# Patient Record
Sex: FEMALE | Race: OTHER | NOT HISPANIC OR LATINO | URBAN - METROPOLITAN AREA
[De-identification: names, ages, dates, MRNs, and addresses within clinical notes are randomized per-mention and may not be internally consistent; named-entity substitution may affect disease eponyms.]

---

## 2019-06-27 ENCOUNTER — EMERGENCY (EMERGENCY)
Facility: HOSPITAL | Age: 29
LOS: 1 days | Discharge: HOME | End: 2019-06-27
Attending: EMERGENCY MEDICINE | Admitting: EMERGENCY MEDICINE
Payer: COMMERCIAL

## 2019-06-27 VITALS
RESPIRATION RATE: 18 BRPM | TEMPERATURE: 98 F | DIASTOLIC BLOOD PRESSURE: 72 MMHG | SYSTOLIC BLOOD PRESSURE: 104 MMHG | HEART RATE: 90 BPM | OXYGEN SATURATION: 97 %
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
## 2019-06-27 DIAGNOSIS — S09.90XA UNSPECIFIED INJURY OF HEAD, INITIAL ENCOUNTER: ICD-10-CM

## 2019-06-27 DIAGNOSIS — R51 HEADACHE: ICD-10-CM
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
## 2019-06-27 PROCEDURE — 70450 CT HEAD/BRAIN W/O DYE: CPT | Mod: 26

## 2019-06-27 PROCEDURE — 99284 EMERGENCY DEPT VISIT MOD MDM: CPT | Mod: 25
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
## 2019-06-27 NOTE — ED ADULT TRIAGE NOTE - NS ED TRIAGE AVPU SCALE
I will SWITCH the dose or number of times a day I take the medications listed below when I get home from the hospital:  None
Alert-The patient is alert, awake and responds to voice. The patient is oriented to time, place, and person. The triage nurse is able to obtain subjective information.
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
## 2019-06-28 NOTE — ED ADULT NURSE NOTE - CHPI ED NUR SYMPTOMS NEG
no blurred vision/no dizziness/no confusion/no loss of consciousness/no syncope/no change in level of consciousness/no nausea/no weakness/no seizure/no vomiting

## 2019-06-28 NOTE — ED PROVIDER NOTE - NSFOLLOWUPINSTRUCTIONS_ED_ALL_ED_FT
For two weeks, please limit your screen time to what is essential, no sports or gym, no swimming, and get  at least 8 hours sleep at night.    See your doctor or call [facility] Services to make a primary care appointment this week.    Avoid alcohol.    Tylenol 650mg or ibuprofen 400mg every 6 hours as needed for pain

## 2020-11-09 NOTE — ED PROVIDER NOTE - NSCAREINITIATED _GEN_ER
Tim Gonzalez(Attending) Thalidomide Pregnancy And Lactation Text: This medication is Pregnancy Category X and is absolutely contraindicated during pregnancy. It is unknown if it is excreted in breast milk.

## 2021-06-07 NOTE — ED ADULT TRIAGE NOTE - BP NONINVASIVE DIASTOLIC (MM HG)
Pulmonary office prescribed meds to pt for cough.  
Rec'vd call from pt with c/o worsening cough.  Pt stated she started to experience this cough about 2 weeks ago & was told to call office if it worsened.  Pt states she has been coughing all day & all night & cannot get a break.  Pt unable to speak full sentences with RN without coughing throughout conversation.      Spoke with Ella MORALES re: pt's current condition & concerns.  ANDREW stated she would consult with pulmonology as pt has been receiving a pulm work up.  
72

## 2021-06-22 ENCOUNTER — EMERGENCY (EMERGENCY)
Facility: HOSPITAL | Age: 31
LOS: 1 days | Discharge: HOME | End: 2021-06-22
Admitting: EMERGENCY MEDICINE
Payer: COMMERCIAL

## 2021-06-22 VITALS
WEIGHT: 100.09 LBS | RESPIRATION RATE: 16 BRPM | OXYGEN SATURATION: 100 % | TEMPERATURE: 98 F | HEART RATE: 89 BPM | HEIGHT: 66 IN | DIASTOLIC BLOOD PRESSURE: 71 MMHG | SYSTOLIC BLOOD PRESSURE: 103 MMHG

## 2021-06-22 VITALS
OXYGEN SATURATION: 99 % | TEMPERATURE: 99 F | DIASTOLIC BLOOD PRESSURE: 66 MMHG | HEART RATE: 69 BPM | SYSTOLIC BLOOD PRESSURE: 98 MMHG | RESPIRATION RATE: 17 BRPM

## 2021-06-22 DIAGNOSIS — K92.1 MELENA: ICD-10-CM

## 2021-06-22 DIAGNOSIS — R10.30 LOWER ABDOMINAL PAIN, UNSPECIFIED: ICD-10-CM

## 2021-06-22 PROBLEM — Z78.9 OTHER SPECIFIED HEALTH STATUS: Chronic | Status: ACTIVE | Noted: 2019-06-28

## 2021-06-22 LAB
ALBUMIN SERPL ELPH-MCNC: 3.8 G/DL — SIGNIFICANT CHANGE UP (ref 3.4–5)
ALP SERPL-CCNC: 46 U/L — SIGNIFICANT CHANGE UP (ref 40–120)
ALT FLD-CCNC: 16 U/L — SIGNIFICANT CHANGE UP (ref 12–42)
ANION GAP SERPL CALC-SCNC: 7 MMOL/L — LOW (ref 9–16)
APPEARANCE UR: CLEAR — SIGNIFICANT CHANGE UP
AST SERPL-CCNC: 13 U/L — LOW (ref 15–37)
BASOPHILS # BLD AUTO: 0.02 K/UL — SIGNIFICANT CHANGE UP (ref 0–0.2)
BASOPHILS NFR BLD AUTO: 0.3 % — SIGNIFICANT CHANGE UP (ref 0–2)
BILIRUB DIRECT SERPL-MCNC: 0.1 MG/DL — SIGNIFICANT CHANGE UP
BILIRUB INDIRECT FLD-MCNC: 0.9 MG/DL — SIGNIFICANT CHANGE UP (ref 0.2–1)
BILIRUB SERPL-MCNC: 1 MG/DL — SIGNIFICANT CHANGE UP (ref 0.2–1.2)
BILIRUB UR-MCNC: NEGATIVE — SIGNIFICANT CHANGE UP
BUN SERPL-MCNC: 11 MG/DL — SIGNIFICANT CHANGE UP (ref 7–23)
CALCIUM SERPL-MCNC: 9.1 MG/DL — SIGNIFICANT CHANGE UP (ref 8.5–10.5)
CHLORIDE SERPL-SCNC: 103 MMOL/L — SIGNIFICANT CHANGE UP (ref 96–108)
CO2 SERPL-SCNC: 24 MMOL/L — SIGNIFICANT CHANGE UP (ref 22–31)
COLOR SPEC: YELLOW — SIGNIFICANT CHANGE UP
CREAT SERPL-MCNC: 0.59 MG/DL — SIGNIFICANT CHANGE UP (ref 0.5–1.3)
DIFF PNL FLD: NEGATIVE — SIGNIFICANT CHANGE UP
EOSINOPHIL # BLD AUTO: 0.11 K/UL — SIGNIFICANT CHANGE UP (ref 0–0.5)
EOSINOPHIL NFR BLD AUTO: 1.4 % — SIGNIFICANT CHANGE UP (ref 0–6)
GLUCOSE SERPL-MCNC: 84 MG/DL — SIGNIFICANT CHANGE UP (ref 70–99)
GLUCOSE UR QL: NEGATIVE — SIGNIFICANT CHANGE UP
HCG UR QL: NEGATIVE — SIGNIFICANT CHANGE UP
HCT VFR BLD CALC: 40.8 % — SIGNIFICANT CHANGE UP (ref 34.5–45)
HGB BLD-MCNC: 13.5 G/DL — SIGNIFICANT CHANGE UP (ref 11.5–15.5)
IMM GRANULOCYTES NFR BLD AUTO: 0.3 % — SIGNIFICANT CHANGE UP (ref 0–1.5)
KETONES UR-MCNC: NEGATIVE — SIGNIFICANT CHANGE UP
LEUKOCYTE ESTERASE UR-ACNC: NEGATIVE — SIGNIFICANT CHANGE UP
LYMPHOCYTES # BLD AUTO: 1.24 K/UL — SIGNIFICANT CHANGE UP (ref 1–3.3)
LYMPHOCYTES # BLD AUTO: 15.7 % — SIGNIFICANT CHANGE UP (ref 13–44)
MANUAL SMEAR VERIFICATION: SIGNIFICANT CHANGE UP
MCHC RBC-ENTMCNC: 32.5 PG — SIGNIFICANT CHANGE UP (ref 27–34)
MCHC RBC-ENTMCNC: 33.1 GM/DL — SIGNIFICANT CHANGE UP (ref 32–36)
MCV RBC AUTO: 98.1 FL — SIGNIFICANT CHANGE UP (ref 80–100)
MONOCYTES # BLD AUTO: 0.59 K/UL — SIGNIFICANT CHANGE UP (ref 0–0.9)
MONOCYTES NFR BLD AUTO: 7.5 % — SIGNIFICANT CHANGE UP (ref 2–14)
NEUTROPHILS # BLD AUTO: 5.92 K/UL — SIGNIFICANT CHANGE UP (ref 1.8–7.4)
NEUTROPHILS NFR BLD AUTO: 74.8 % — SIGNIFICANT CHANGE UP (ref 43–77)
NITRITE UR-MCNC: NEGATIVE — SIGNIFICANT CHANGE UP
NRBC # BLD: 0 /100 WBCS — SIGNIFICANT CHANGE UP (ref 0–0)
PH UR: 6 — SIGNIFICANT CHANGE UP (ref 5–8)
PLAT MORPH BLD: NORMAL — SIGNIFICANT CHANGE UP
PLATELET # BLD AUTO: 91 K/UL — LOW (ref 150–400)
POTASSIUM SERPL-MCNC: 4 MMOL/L — SIGNIFICANT CHANGE UP (ref 3.5–5.3)
POTASSIUM SERPL-SCNC: 4 MMOL/L — SIGNIFICANT CHANGE UP (ref 3.5–5.3)
PROT SERPL-MCNC: 7.5 G/DL — SIGNIFICANT CHANGE UP (ref 6.4–8.2)
PROT UR-MCNC: NEGATIVE MG/DL — SIGNIFICANT CHANGE UP
RBC # BLD: 4.16 M/UL — SIGNIFICANT CHANGE UP (ref 3.8–5.2)
RBC # FLD: 12.9 % — SIGNIFICANT CHANGE UP (ref 10.3–14.5)
RBC BLD AUTO: NORMAL — SIGNIFICANT CHANGE UP
SODIUM SERPL-SCNC: 134 MMOL/L — SIGNIFICANT CHANGE UP (ref 132–145)
SP GR SPEC: 1.01 — SIGNIFICANT CHANGE UP (ref 1–1.03)
UROBILINOGEN FLD QL: 0.2 E.U./DL — SIGNIFICANT CHANGE UP
WBC # BLD: 7.9 K/UL — SIGNIFICANT CHANGE UP (ref 3.8–10.5)
WBC # FLD AUTO: 7.9 K/UL — SIGNIFICANT CHANGE UP (ref 3.8–10.5)

## 2021-06-22 PROCEDURE — 99284 EMERGENCY DEPT VISIT MOD MDM: CPT

## 2021-06-22 RX ORDER — FAMOTIDINE 10 MG/ML
20 INJECTION INTRAVENOUS ONCE
Refills: 0 | Status: COMPLETED | OUTPATIENT
Start: 2021-06-22 | End: 2021-06-22

## 2021-06-22 RX ADMIN — FAMOTIDINE 20 MILLIGRAM(S): 10 INJECTION INTRAVENOUS at 16:03

## 2021-06-22 NOTE — ED ADULT TRIAGE NOTE - CHIEF COMPLAINT QUOTE
black stools/ brbpr began last night w/ lower abdominal- diarrhea x 1 week accompanied by weakness/fatigue worsening today

## 2021-06-22 NOTE — ED PROVIDER NOTE - PATIENT PORTAL LINK FT
You can access the FollowMyHealth Patient Portal offered by [facility] by registering at the following website: [email or web address]. By joining [facility]’s FollowMyHealth portal, you will also be able to view your health information using other applications (apps) compatible with our system.

## 2021-06-22 NOTE — ED PROVIDER NOTE - PHYSICAL EXAMINATION
Chaperoned BY PCA Regla Lombardi    Physical Exam    Vital Signs: I have reviewed the initial vital signs.  Constitutional: well-nourished, appears stated age, no acute distress  Eyes: PERRLA, and symmetrical lids.  ENT: Neck supple with no adenopathy, moist MM.  Cardiovascular: regular rate, regular rhythm, well-perfused extremities  Respiratory: unlabored respiratory effort, clear to auscultation bilaterally  Gastrointestinal: soft, non-tender abdomen, rectal exam brown stools, no rectal tares, no hemorrhoids   Musculoskeletal: supple neck, no lower extremity edema  Integumentary: warm, dry, no rash  Neurologic: extremities’ motor and sensory functions grossly intact  Psychiatric: A&Ox3

## 2021-06-22 NOTE — ED PROVIDER NOTE - CARE PROVIDER_API CALL
CHAN NOLASCO  [id number]  [address] [place] [address]  Phone: [phone]  Fax: [phone]  Follow Up Time:

## 2021-06-22 NOTE — ED PROVIDER NOTE - NS ED ROS FT
Review of Systems    Constitutional: (-) fever  Eyes/ENT: (-) change in vision, (-) sore throat, (-) ear pain  Cardiovascular: (-) chest pain, (-) palpitation   Respiratory: (-) cough, (-) shortness of breath  Gastrointestinal: (-) abdominal pain (-) vomiting, (-) diarrhea  : (-) vaginal bleeding, (-) vaginal discharge  Integumentary: (-) rash, (-) edema  Neurological: (-) headache, (-) altered mental status  Heme/Lymph: (-) easy bruising (-) easy bleeding (-) lymphadenopathy  Allergic/Immunologic: (-) pruritus

## 2021-06-22 NOTE — ED PROVIDER NOTE - NSFOLLOWUPINSTRUCTIONS_ED_ALL_ED_FT
RETURN TO THE ED IF YOU DEVELOP WORSENING ABDOMINAL PAIN  WORSENING EPISODES OF DARK OR BLOODY STOOLS WITH LOSS OF CONSCIOUSNESS, LIGHTHEADEDNESS, CHEST PAIN AND SHORTNESS OF BREATH    FOLLOW UP WITH GI AS DIRECTED      Abdominal Pain    Many things can cause abdominal pain. Usually, abdominal pain is not caused by a disease and will improve without treatment. Your health care provider will do a physical exam and possibly order blood tests and imaging to help determine the seriousness of your pain. However, in many cases, no cause may be found and you may need further testing as an outpatient. Monitor your abdominal pain for any changes.     SEEK IMMEDIATE MEDICAL CARE IF YOU HAVE THE FOLLOWING SYMPTOMS: worsening abdominal pain, vomiting, diarrhea, inability to have bowel movements or pass gas, black or bloody stool, fever accompanying chest pain or back pain, or dizziness/lightheadedness.

## 2021-06-22 NOTE — ED PROVIDER NOTE - CLINICAL SUMMARY MEDICAL DECISION MAKING FREE TEXT BOX
Pt is a well appearing 31 yo f pw lower abd cramping with reports of dark stools 2 d x2 episodes with soft non tender abdomen with no guarding, rectal exam normal with brown stools. CBC Hgb 13.5, pt abdomen continues to be soft non tender. Plan follow up with GI.

## 2021-06-22 NOTE — ED PROVIDER NOTE - OBJECTIVE STATEMENT
31 yo f pmhx sig for PUD w ?H. Pylori 8 years ago (eradicated as per pt) pw lower abd cramping with occasional dark stools x2 d in duration not associated with lower abd pain. Pt reports drinking ETOH 2 d ago, then had 2 bowel movements with dark/black stools, spoke to her GI MD cousin who told her to come to the ED for eval.     I have reviewed available current nursing and previous documentation of past medical, surgical, family, and/or social history.

## 2021-10-04 NOTE — ED ADULT NURSE NOTE - OBJECTIVE STATEMENT
Pt presents c/o 4/10 lower abdominal cramping w/ diarrhea x1 week.  Pt denies N/V or dysuria.  Pt states "my stools were dark today."  Pt denies dizziness.  Pt pending labs. yes

## 2022-01-11 ENCOUNTER — APPOINTMENT (OUTPATIENT)
Dept: HEART AND VASCULAR | Facility: CLINIC | Age: 32
End: 2022-01-11
Payer: COMMERCIAL

## 2022-01-11 ENCOUNTER — NON-APPOINTMENT (OUTPATIENT)
Age: 32
End: 2022-01-11

## 2022-01-11 VITALS
SYSTOLIC BLOOD PRESSURE: 98 MMHG | HEIGHT: 65.5 IN | WEIGHT: 107 LBS | BODY MASS INDEX: 17.61 KG/M2 | HEART RATE: 77 BPM | TEMPERATURE: 97.5 F | DIASTOLIC BLOOD PRESSURE: 63 MMHG | OXYGEN SATURATION: 99 %

## 2022-01-11 DIAGNOSIS — Z82.49 FAMILY HISTORY OF ISCHEMIC HEART DISEASE AND OTHER DISEASES OF THE CIRCULATORY SYSTEM: ICD-10-CM

## 2022-01-11 DIAGNOSIS — E78.5 HYPERLIPIDEMIA, UNSPECIFIED: ICD-10-CM

## 2022-01-11 DIAGNOSIS — F41.9 ANXIETY DISORDER, UNSPECIFIED: ICD-10-CM

## 2022-01-11 DIAGNOSIS — Z82.3 FAMILY HISTORY OF STROKE: ICD-10-CM

## 2022-01-11 DIAGNOSIS — Z78.9 OTHER SPECIFIED HEALTH STATUS: ICD-10-CM

## 2022-01-11 PROBLEM — Z00.00 ENCOUNTER FOR PREVENTIVE HEALTH EXAMINATION: Status: ACTIVE | Noted: 2022-01-11

## 2022-01-11 PROCEDURE — 99205 OFFICE O/P NEW HI 60 MIN: CPT

## 2022-01-11 PROCEDURE — 93000 ELECTROCARDIOGRAM COMPLETE: CPT

## 2022-01-11 NOTE — HISTORY OF PRESENT ILLNESS
[FreeTextEntry1] : 31F here for evaluation of CV risk in he setting of family history of very early heart disease. \par \par Feels well, no cardiac symptoms of CP/SOB/syncope/presyncope\par \par Saw lipidologist Diane Benitez at [facility] who ran some blood tests on her yesterday including lipo (a) -- results pending.\par \par Her  is a  who ran a test for FH in her and found a single nucleotide mutation in the LDL receptor which is listed as pathognomonic, but not classic FH gene mutation. 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 2-3 drinks/week (beer or whiskey) Vegetarian.\par \par Family Hx: Father - MI @38  and 42 (smoker). Paternal GF - stroke @ 50. Mother and brother without ASCVD or HLD\par \par ====Data===\par Lipids 7/2021: , HDL 86, TG 65, , LDL-P 1705 (high)

## 2022-01-11 NOTE — PHYSICAL EXAM
[Normal] : clear lung fields, good air entry, no respiratory distress [Soft] : abdomen soft [Non Tender] : non-tender [No Edema] : no edema [Moves all extremities] : moves all extremities [de-identified] : Corneal arcus+, no xanthelasma [de-identified] : no xanthomas

## 2022-01-11 NOTE — DISCUSSION/SUMMARY
[FreeTextEntry1] : 31F with family hx of premature ASCVD (father MI @38), PCOS, HLD and LDL-R SNP mutation of unclear significance \par \par #HLD: FH is possible with likely circumferential arcus and family hx but premature ASCVD in two generation but LDL is not as consistent with FH\par \par - Refer to Dr Alford for cardiac genetic consult\par - Discussed at length of benefit vs risk of statin including family planning. For now, favor lifestyle changes including starting an exercise regimen. \par - We can consider statin once we have more information (i.e. lipoprotein (a)) result\par carotid US to eval for subclinical disease, not a good candidate for calcium score as more likely to have uncalcified plaque at this young age\par -She is amenable to a lifetime reduced LDL level in order to optimize her risk and is agreeable to starting statin and stopping as soon as she decides to start trying to get pregnant. 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 We discussed the literature on statins and pregnancy and she wishes to be sure to stop prior to getting pregnant. \par \par Follow up in 1-2 months when we have the data from labs done yesterday and genetic counseling from our colleagues Dr. Alford and Cathy Melo. Will start statin at that time.

## 2022-01-11 NOTE — END OF VISIT
[] : Fellow [FreeTextEntry3] : Patient seen and examined. Case discussed with preventive cardiology fellow. Agree with plan as detailed above.  [Time Spent: ___ minutes] : I have spent [unfilled] minutes of time on the encounter.

## 2022-03-17 ENCOUNTER — APPOINTMENT (OUTPATIENT)
Dept: CARDIOLOGY | Facility: CLINIC | Age: 32
End: 2022-03-17

## 2022-05-11 ENCOUNTER — APPOINTMENT (OUTPATIENT)
Dept: CARDIOLOGY | Facility: CLINIC | Age: 32
End: 2022-05-11
Payer: COMMERCIAL

## 2022-05-11 PROCEDURE — 99204 OFFICE O/P NEW MOD 45 MIN: CPT | Mod: 95

## 2022-05-11 RX ORDER — ATORVASTATIN CALCIUM 20 MG/1
20 TABLET, FILM COATED ORAL DAILY
Qty: 90 | Refills: 3 | Status: ACTIVE | COMMUNITY
Start: 2022-05-11 | End: 1900-01-01

## 2022-05-25 NOTE — REASON FOR VISIT
[FreeTextEntry3] : JOSEMANUEL GONZALEZ  was seen  for an initial consultation at the [facility] at [facility] on 05/11/2022.   Ms. GONZALEZ was referred by Dr. Moyer for hereditary cardiac predisposition risk assessment and counseling, due to HLD and genetic finding on prior genetic testing\par \par

## 2022-05-25 NOTE — FAMILY HISTORY
[FreeTextEntry1] : FamilyHistory_20_twCiteListControlStart FamilyHistory_20_twCiteListControlEnd [id number] \par A four-generation family history was constructed and scanned into [employer]. \par Family history is significant for: \par siblings\par 34 yo brother: nml cholesterol, no findings in LDLR\par \par Mother 59 yo nml cholesterol, not with LDLR vus\par Maternal aunts x1 , lae 60s healthy: 41 yo healthy daughter, 46 yo son HLD \par Maternal uncles x2\par uncle 60s hld: 2 daughters, daughter 41 yo healthy, daughter dec CA 34 yo\par uncle 60s no known med probs: daughter 30 yo, son 21yo grossly healthy \par Maternal Grandmother dec early 70s heart prob, hx MI a few years prior\par Maternal Grandfather  dec early 60s hx CVA \par \par Father 41 yo first MI at 40 yo, dec heart disease\par Paternal aunts x1, 50s, HLD, LDLR variant : one child with nml lipids daughter 35 yo, 26 yo sister\par \par Paternal uncles x2\par uncle 40s dec med hx unk: + children : son 30 yo\par uncle  40s dec med hx unk: + children : son 15 yo \par uncle 50s grossly healthy : son 38 yo , daughter 30\par \par Paternal Grandfather dec 40s cardiac disease, sudden \par Paternal Grandmother dec \par \par children: none, trying to conceive \par \par her maternal families originate from [place] and paternal families originate from [place]  \par No Ashkenazi Amish ancestry. \par Family history was negative for consanguinity  \par No family history of SIDS\par  \par \par  [FreeTextEntry2] : [place]  [FreeTextEntry3] : [place]

## 2022-05-25 NOTE — HISTORY OF PRESENT ILLNESS
[Home] : at home, [unfilled] , at the time of the visit. 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

## 2022-11-14 ENCOUNTER — APPOINTMENT (OUTPATIENT)
Dept: CARDIOLOGY | Facility: CLINIC | Age: 32
End: 2022-11-14

## 2022-12-13 ENCOUNTER — NON-APPOINTMENT (OUTPATIENT)
Age: 32
End: 2022-12-13

## 2022-12-13 ENCOUNTER — APPOINTMENT (OUTPATIENT)
Dept: HEART AND VASCULAR | Facility: CLINIC | Age: 32
End: 2022-12-13

## 2022-12-13 VITALS
BODY MASS INDEX: 17.83 KG/M2 | DIASTOLIC BLOOD PRESSURE: 75 MMHG | HEART RATE: 86 BPM | SYSTOLIC BLOOD PRESSURE: 112 MMHG | TEMPERATURE: 97.7 F | HEIGHT: 65.5 IN | OXYGEN SATURATION: 99 % | WEIGHT: 108.31 LBS

## 2022-12-13 DIAGNOSIS — Z82.49 FAMILY HISTORY OF ISCHEMIC HEART DISEASE AND OTHER DISEASES OF THE CIRCULATORY SYSTEM: ICD-10-CM

## 2022-12-13 PROCEDURE — 99215 OFFICE O/P EST HI 40 MIN: CPT

## 2022-12-13 PROCEDURE — 93000 ELECTROCARDIOGRAM COMPLETE: CPT

## 2022-12-13 PROCEDURE — 36415 COLL VENOUS BLD VENIPUNCTURE: CPT

## 2022-12-14 LAB
CHOLEST SERPL-MCNC: 178 MG/DL
HDLC SERPL-MCNC: 93 MG/DL
LDLC SERPL CALC-MCNC: 72 MG/DL
NONHDLC SERPL-MCNC: 84 MG/DL
TRIGL SERPL-MCNC: 60 MG/DL

## 2022-12-15 ENCOUNTER — NON-APPOINTMENT (OUTPATIENT)
Age: 32
End: 2022-12-15

## 2022-12-15 LAB — APO LP(A) SERPL-MCNC: 42.6 NMOL/L

## 2022-12-27 ENCOUNTER — APPOINTMENT (OUTPATIENT)
Dept: HEART AND VASCULAR | Facility: CLINIC | Age: 32
End: 2022-12-27
Payer: COMMERCIAL

## 2022-12-27 PROCEDURE — 93880 EXTRACRANIAL BILAT STUDY: CPT

## 2022-12-28 ENCOUNTER — NON-APPOINTMENT (OUTPATIENT)
Age: 32
End: 2022-12-28

## 2023-01-03 ENCOUNTER — OUTPATIENT (OUTPATIENT)
Dept: OUTPATIENT SERVICES | Facility: HOSPITAL | Age: 33
LOS: 1 days | End: 2023-01-03

## 2023-01-03 ENCOUNTER — APPOINTMENT (OUTPATIENT)
Dept: CT IMAGING | Facility: CLINIC | Age: 33
End: 2023-01-03
Payer: SELF-PAY

## 2023-01-03 ENCOUNTER — RESULT REVIEW (OUTPATIENT)
Age: 33
End: 2023-01-03

## 2023-01-03 PROCEDURE — 75571 CT HRT W/O DYE W/CA TEST: CPT | Mod: 26

## 2023-01-11 ENCOUNTER — APPOINTMENT (OUTPATIENT)
Dept: HEART AND VASCULAR | Facility: CLINIC | Age: 33
End: 2023-01-11
Payer: COMMERCIAL

## 2023-01-11 DIAGNOSIS — E78.49 OTHER HYPERLIPIDEMIA: ICD-10-CM

## 2023-01-11 PROCEDURE — 99214 OFFICE O/P EST MOD 30 MIN: CPT | Mod: 95

## 2023-01-11 NOTE — DISCUSSION/SUMMARY
[FreeTextEntry1] : 32F with ?PCOS, anxiety, HLD, FH of early heart disease, and ?LDL-R receptor mutation (??variant of unknown significance. 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 \par \par #HLD: FH is possible with likely circumferential arcus, but would be due to a VUS. Still warranting aggressive lipid lowering at this time and optimized on current dose of atorva 20 mg daily. \par - LDL 72\par - CAC= 0; carotid doppler revealed no significant atherosclerosis \par - Advised to stop statin 2-3 months prior to pregnancy when she plans to start attempting to conceive. We do not feel she is high risk enough to warrant continuation of pravastatin during pregnancy and likely not even likely to require colesevelam\par -She is amenable to a lifetime reduced LDL level in order to optimize her risk and will take statin when not attempting pregnancy and work to optimize lifestyle. Also discussed that she does not need to stop statin for the purpose of egg harvesting. \par \par Return at least yearly. She knows to notify us if she does get pregnant and to eat a high fiber low saturated fat diet during her pregnancy.

## 2023-01-11 NOTE — HISTORY OF PRESENT ILLNESS
[FreeTextEntry1] : 32F with ?PCOS, anxiety, HLD, FH of early heart disease, and ?LDL-R receptor mutation (??variant of unknown significance. 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 LDL wel controlled at 72. Lpa normal. She reports feeling well. Patient denies any chest pain, SOB, palpitations, orthopnea, PND or LE edema.\par \par \par Still unclear whether she has PCOS. \par \par She reports that they are planning to start pregnancy planning in August 2023. \par \par Saw Dr. 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 2-3 drinks/week (beer or whiskey) Vegetarian.\par \par Family Hx: Father - MI @38 and 42 (smoker). Paternal GF - stroke @ 50. Mother and brother without ASCVD or HLD\par \par ====Data===\par Lipids 7/2021: , HDL 86, TG 65, , LDL-P 1705 (high)\par 12/2022: chol 178, LDL 72, trig 60, HDL 93; Lp(a) 42.6 nmol/L

## 2023-10-09 NOTE — ED ADULT NURSE NOTE - EXTENSIONS OF SELF_ADULT
None Otezla Pregnancy And Lactation Text: This medication is Pregnancy Category C and it isn't known if it is safe during pregnancy. It is unknown if it is excreted in breast milk.
